# Patient Record
(demographics unavailable — no encounter records)

---

## 2025-03-06 NOTE — HISTORY OF PRESENT ILLNESS
[de-identified] : JACKELIN NELSON is a 31 year female who had an allergy test done over 10 years ago, it said she was allergic to shrimp and crab, she would still eat it occasionally and would get mild symptoms, she has noticed that over the years she her allergy has worsened, now when she eats crab her tongue feels itch. She has no seasonal allergies or asthma. She suspects she is allergic to cats, she once visited a friend who had a pet cat and her eyes instantly got red and itchy. She is here today for possible allergy testing and to know what foods she should keep avoiding. She gets itchy mouth with crab shells. She has no reaction to shrimp and lobster.    She is here today for her today for labs follow up, she says last month she went on a trip she noticed redness on her lip, the next day she had a bump in the middle of her lip, she was unsure if this was a cold sore or allergic reaction.